# Patient Record
Sex: MALE | Race: WHITE | NOT HISPANIC OR LATINO | Employment: FULL TIME | URBAN - METROPOLITAN AREA
[De-identification: names, ages, dates, MRNs, and addresses within clinical notes are randomized per-mention and may not be internally consistent; named-entity substitution may affect disease eponyms.]

---

## 2019-03-20 ENCOUNTER — HOSPITAL ENCOUNTER (EMERGENCY)
Facility: HOSPITAL | Age: 24
Discharge: HOME | End: 2019-03-20
Attending: EMERGENCY MEDICINE
Payer: COMMERCIAL

## 2019-03-20 VITALS
HEART RATE: 65 BPM | OXYGEN SATURATION: 99 % | RESPIRATION RATE: 16 BRPM | TEMPERATURE: 98 F | WEIGHT: 235 LBS | DIASTOLIC BLOOD PRESSURE: 82 MMHG | SYSTOLIC BLOOD PRESSURE: 138 MMHG

## 2019-03-20 DIAGNOSIS — N50.811 RIGHT TESTICULAR PAIN: ICD-10-CM

## 2019-03-20 LAB
BILIRUB UR QL STRIP: NEGATIVE
CLARITY UR REFRACT.AUTO: CLEAR
COLOR UR AUTO: YELLOW
GLUCOSE UR QL STRIP: NEGATIVE
HGB UR QL STRIP: NEGATIVE
KETONES UR QL STRIP: NEGATIVE
LEUKOCYTE ESTERASE UR QL STRIP: NEGATIVE
NITRITE UR QL STRIP: NEGATIVE
PH UR STRIP: 7 [PH] (ref 5–8)
PROT UR QL STRIP: ABNORMAL
SP GR UR STRIP: 1.01 (ref 1–1.03)
URN SPEC COLLECT METH UR: ABNORMAL
UROBILINOGEN UR STRIP-ACNC: NEGATIVE EU/DL

## 2019-03-20 PROCEDURE — 25000003 PHARM REV CODE 250: Mod: ER | Performed by: PHYSICIAN ASSISTANT

## 2019-03-20 PROCEDURE — 81003 URINALYSIS AUTO W/O SCOPE: CPT | Mod: ER

## 2019-03-20 PROCEDURE — 87491 CHLMYD TRACH DNA AMP PROBE: CPT | Mod: ER

## 2019-03-20 PROCEDURE — 99284 EMERGENCY DEPT VISIT MOD MDM: CPT | Mod: 25,ER

## 2019-03-20 RX ORDER — NAPROXEN 250 MG/1
500 TABLET ORAL
Status: COMPLETED | OUTPATIENT
Start: 2019-03-20 | End: 2019-03-20

## 2019-03-20 RX ORDER — NAPROXEN 500 MG/1
500 TABLET ORAL 2 TIMES DAILY WITH MEALS
Qty: 12 TABLET | Refills: 0 | Status: SHIPPED | OUTPATIENT
Start: 2019-03-20

## 2019-03-20 RX ADMIN — NAPROXEN 500 MG: 250 TABLET ORAL at 12:03

## 2019-03-20 NOTE — DISCHARGE INSTRUCTIONS
Your ultrasound did not reveal any evidence of testicular torsion or other acute findings.  Your urine did not reveal any evidence of urinary tract infection.   You are advised to follow up with your primary care provider for re-evaluation within 3 days.  You are instructed to return to the emergency department immediately for any new or worsening symptoms.

## 2019-03-20 NOTE — ED PROVIDER NOTES
Encounter Date: 3/20/2019       History     Chief Complaint   Patient presents with    Testicle Pain     right testicle pain for two days      23-year-old male presents emergency department for evaluation of 3 day history of mild right-sided testicular pain.  He reports that the pain is constant but has been waxing and waning throughout the last 3 days.  He reports that it sometimes is worse with palpation and movement.  He reports that is mild achy pain knees unsure if there was any trauma. He denies any difficulty urinating, frequency of urination or burning with urination.  He denies any testicular swelling, scrotal/penile rash or penile discharge. No treatment was attempted prior to arrival.  He reports that his brother had a history of torsion and he wanted to be checked.  He denies any other associated symptoms including fever, abdominal pain, nausea, vomiting or diarrhea.          Review of patient's allergies indicates:  No Known Allergies  History reviewed. No pertinent past medical history.  History reviewed. No pertinent surgical history.  Family History   Problem Relation Age of Onset    Glaucoma Neg Hx     Macular degeneration Neg Hx     Retinal detachment Neg Hx      Social History     Tobacco Use    Smoking status: Never Smoker    Smokeless tobacco: Never Used   Substance Use Topics    Alcohol use: No     Frequency: Never    Drug use: No     Review of Systems   Constitutional: Negative for activity change, appetite change and fever.   HENT: Negative for congestion, facial swelling, postnasal drip, rhinorrhea, sinus pain, sore throat, trouble swallowing and voice change.    Eyes: Negative for photophobia and visual disturbance.   Respiratory: Negative for cough, chest tightness, shortness of breath and wheezing.    Cardiovascular: Negative for chest pain.   Gastrointestinal: Negative for abdominal pain, constipation, diarrhea, nausea and vomiting.   Genitourinary: Positive for testicular pain.  Negative for decreased urine volume, discharge, dysuria, flank pain, hematuria, penile pain, penile swelling and scrotal swelling.   Musculoskeletal: Negative for back pain, joint swelling, neck pain and neck stiffness.   Skin: Negative for rash.   Neurological: Negative for dizziness, weakness, light-headedness, numbness and headaches.   Hematological: Does not bruise/bleed easily.   Psychiatric/Behavioral: Negative for agitation.       Physical Exam     Initial Vitals [03/20/19 1111]   BP Pulse Resp Temp SpO2   138/82 65 16 98.4 °F (36.9 °C) 99 %      MAP       --         Physical Exam    Nursing note and vitals reviewed.  Constitutional: He appears well-developed and well-nourished. He is not diaphoretic. No distress.   HENT:   Head: Normocephalic and atraumatic.   Right Ear: External ear normal.   Left Ear: External ear normal.   Mouth/Throat: Oropharynx is clear and moist. No oropharyngeal exudate.   Eyes: Conjunctivae and EOM are normal. Pupils are equal, round, and reactive to light.   Neck: Normal range of motion. Neck supple.   Cardiovascular: Normal rate, regular rhythm and normal heart sounds.   Pulmonary/Chest: Breath sounds normal. No respiratory distress. He has no wheezes. He has no rhonchi. He has no rales. He exhibits no tenderness.   Abdominal: Soft. Bowel sounds are normal. He exhibits no distension. There is no tenderness. There is no guarding.   Genitourinary: Testes normal and penis normal. Cremasteric reflex is present. Right testis shows no mass, no swelling and no tenderness. Right testis is descended. Left testis shows no mass, no swelling and no tenderness. Left testis is descended. Circumcised. No phimosis, hypospadias, penile erythema or penile tenderness. No discharge found.   Musculoskeletal: Normal range of motion.   Lymphadenopathy:     He has no cervical adenopathy.   Neurological: He is alert and oriented to person, place, and time.   Skin: Skin is warm and dry.   Psychiatric: He  has a normal mood and affect.         ED Course   Procedures  Labs Reviewed   URINALYSIS, REFLEX TO URINE CULTURE - Abnormal; Notable for the following components:       Result Value    Protein, UA Trace (*)     All other components within normal limits    Narrative:     Preferred Collection Type->Urine, Clean Catch   C. TRACHOMATIS/N. GONORRHOEAE BY AMP DNA          Imaging Results          US Scrotum And Testicles (Final result)  Result time 03/20/19 11:54:27    Final result by MINDA Kyle Sr., MD (03/20/19 11:54:27)                 Impression:      Normal study      Electronically signed by: Cornelius Kyle MD  Date:    03/20/2019  Time:    11:54             Narrative:    EXAMINATION:  US SCROTUM AND TESTICLES    CLINICAL HISTORY:  Right testicular pain    TECHNIQUE:  Multiple static ultrasound images are submitted for interpretation with color flow and spectral Doppler imaging.    COMPARISON:  None    FINDINGS:  The following pertains to the right hemiscrotum.  There is a normal appearing amount of fluid within the right hemiscrotum.  The testicle is normal in appearance.  It measures 4.5 cm x 2.5 cm x 3.0 cm.  It has normal arterial flow with a peak systolic velocity of 3 centimeters/second.  The head of the right epididymis is normal in appearance.  It measures 5 mm in craniocaudal dimension and has a normal appearing amount of vascularity.    The following pertains to the left hemiscrotum.  There is a normal appearing amount of fluid within the left hemiscrotum.  The testicle is normal in appearance.  It measures 4.3 cm x 2.2 cm x 2.9 cm.  It has normal arterial flow with a peak systolic velocity of 5 centimeters/second.  The head of the left epididymis is normal in appearance.  It measures 8 mm in craniocaudal dimension and has a normal appearing amount of vascularity.                                 Medical Decision Making:   Initial Assessment:   23-year-old male presents for evaluation of right  testicular pain. Physical exam reveals a nontoxic-appearing male in no acute distress. Patient is afebrile vital signs within normal limits. Patient is afebrile vital signs within normal limits. Neurological exam reveals an alert and oriented patient.  Neck is supple, no meningeal signs noted. Lungs clear to auscultation bilaterally.  No respiratory distress or accessory muscle use noted. Abdominal exam reveals soft abdomen, nontender to palpation. No CVA tenderness noted.   exam reveals no erythema, edema or ecchymosis noted over the penis or scrotum.  No rash or ulcerations noted. No discharge noted. No tenderness to palpation noted over the testicles bilaterally. No phrens sign noted.   Differential Diagnosis:   UTI  Ultrasound ordered to assess for possible serious etiology including testicular torsion.    Gonorrhea  Chlamydia  ED Management:  Urinalysis reveals no evidence of UTI or hematuria.  GC/chlamydia culture pending.  Ultrasound reveals a normal exam.  Possible groin strain.  Patient given Naprosyn for symptom control.  Instructed the patient to follow up with his primary care provider for re-evaluation within 3 days.  Instructed the patient to return to the emergency department immediately for any new or worsening symptoms. I discussed this patient at length with  who is in agreement with the course of treatment.                       Clinical Impression:       ICD-10-CM ICD-9-CM   1. Right testicular pain N50.811 608.9                                Iona Walters PA-C  03/20/19 1527

## 2019-03-21 LAB
C TRACH DNA SPEC QL NAA+PROBE: NOT DETECTED
N GONORRHOEA DNA SPEC QL NAA+PROBE: NOT DETECTED